# Patient Record
Sex: MALE | Race: WHITE
[De-identification: names, ages, dates, MRNs, and addresses within clinical notes are randomized per-mention and may not be internally consistent; named-entity substitution may affect disease eponyms.]

---

## 2018-08-17 ENCOUNTER — HOSPITAL ENCOUNTER (EMERGENCY)
Dept: HOSPITAL 25 - ED | Age: 18
LOS: 1 days | Discharge: HOME | End: 2018-08-18
Payer: COMMERCIAL
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
## 2018-08-17 DIAGNOSIS — S61.211A: Primary | ICD-10-CM

## 2018-08-17 PROCEDURE — 99282 EMERGENCY DEPT VISIT SF MDM: CPT

## 2018-08-17 PROCEDURE — 96374 THER/PROPH/DIAG INJ IV PUSH: CPT

## 2018-08-18 VITALS — DIASTOLIC BLOOD PRESSURE: 67 MMHG | SYSTOLIC BLOOD PRESSURE: 114 MMHG

## 2018-08-18 NOTE — ED
Laceration/Wound HPI





- HPI Summary


HPI Summary: 


18 male presents with left index finger laceration today.  He states he cut it 

on a knife.  No foreign body in the wound.  immunizations are up-to-date.  No 

numbness or tingling.  No active bleeding.  He has no medical conditions.








- History of Current Complaint


Stated Complaint: LT INDEX FINGER LACERATION


Time Seen by Provider: 08/18/18 00:43


Pain Intensity: 2





- Allergy/Home Medications


Allergies/Adverse Reactions: 


 Allergies











Allergy/AdvReac Type Severity Reaction Status Date / Time


 


No Known Allergies Allergy   Verified 08/17/18 23:41














PMH/Surg Hx/FS Hx/Imm Hx


Endocrine/Hematology History: 


   Denies: Hx Anticoagulant Therapy


Cardiovascular History: 


   Denies: Hx Myocardial Infarction


Infectious Disease History: No


Infectious Disease History: Reports: Traveled Outside the [place] in Last 30 Days - 

[place]





- Family History


Known Family History: 


   Negative: Diabetes





- Social History


Substance Use Type: Reports: None


Smoking Status (MU): Never Smoked Tobacco





Review of Systems


Negative: Fever


Negative: Chest Pain


Negative: Shortness Of Breath


Positive: Other - left index finger lac


All Other Systems Reviewed And Are Negative: Yes





Physical Exam


Triage Information Reviewed: Yes


Vital Signs On Initial Exam: 


 Initial Vitals











Temp Pulse Resp BP Pulse Ox


 


 98.2 F   92   16   138/82   99 


 


 08/17/18 23:37  08/17/18 23:37  08/17/18 23:37  08/17/18 23:37  08/17/18 23:37











Vital Signs Reviewed: Yes


Appearance: Positive: Well-Appearing


Skin: Positive: Warm, Dry, Other - 2cm superifical proximal phalanx left index 

finger


Head/Face: Positive: Normal Head/Face Inspection


Eyes: Positive: Normal, Conjunctiva Clear


ENT: Positive: Pharynx normal


Respiratory/Lung Sounds: Positive: Clear to Auscultation, Breath Sounds Present


Cardiovascular: Positive: Normal, RRR


Musculoskeletal: Positive: Strength/ROM Intact - left index finger, Other - 

capillary refill <2 secs


Neurological: Positive: Normal


Psychiatric: Positive: Normal





Procedures





- Laceration/Wound Repair


  ** 1


Location: Other - left index finger lac


Description: Linear


Length, Depth and Shape: 2cm superficial


Irrigated w/ Saline (ccs): 50


Closure: Skin Adhesive, SteriStrips


Sterile Dressing Applied?: No - metal finger splint





Diagnostics





- Vital Signs


 Vital Signs











  Temp Pulse Resp BP Pulse Ox


 


 08/17/18 23:37  98.2 F  92  16  138/82  99














- Laboratory


Lab Statement: Any lab studies that have been ordered have been reviewed, and 

results considered in the medical decision making process.





Laceration Repair Course/Dx





- Course


Course Of Treatment: 18 male presents with left index finger laceration today.  

He states he cut it on a knife.  No foreign body in the wound.  immunizations 

are up-to-date.  No numbness or tingling.  No active bleeding.  He has no 

medical conditions.  On exam has 2 cm superficial laceration of proximal 

phalanx of left index finger.  Neurovascularly intact.  Clean area and 

discussed with patient will place glue area.  Placed in a metal finger splint.  

Patient understands agrees with plan.





- Differential Dx


Differental Diagnoses: Abrasion, Avulsion, Laceration





- Clinical Impression


Provider Diagnoses: 


 Laceration of left index finger








Discharge





- Sign-Out/Discharge


Documenting (check all that apply): Patient Departure





- Discharge Plan


Condition: Good


Disposition: HOME


Patient Education Materials:  Skin Adhesive Care (ED)


Referrals: 


No Primary Care Phys,NOPCP [Primary Care Provider] - 


Additional Instructions: 


Place ice on area


Take Tylenol or ibuprofen for pain as needed every 6 hours


Keep dry for 24 hours


Glue will fall off on own   


keep splint on area for next 7 days


Return to ED if develop any signs of infection or any new or worsening symptoms





- Billing Disposition and Condition


Condition: GOOD


Disposition: Home

## 2019-02-27 ENCOUNTER — HOSPITAL ENCOUNTER (OUTPATIENT)
Dept: HOSPITAL 25 - ED | Age: 19
Setting detail: OBSERVATION
LOS: 2 days | Discharge: HOME | End: 2019-03-01
Attending: INTERNAL MEDICINE | Admitting: INTERNAL MEDICINE
Payer: COMMERCIAL

## 2019-02-27 DIAGNOSIS — E83.42: ICD-10-CM

## 2019-02-27 DIAGNOSIS — E86.0: ICD-10-CM

## 2019-02-27 DIAGNOSIS — K21.9: ICD-10-CM

## 2019-02-27 DIAGNOSIS — R11.2: ICD-10-CM

## 2019-02-27 DIAGNOSIS — R10.9: ICD-10-CM

## 2019-02-27 DIAGNOSIS — K52.9: Primary | ICD-10-CM

## 2019-02-27 DIAGNOSIS — R00.0: ICD-10-CM

## 2019-02-27 LAB
ALBUMIN SERPL BCG-MCNC: 4.8 G/DL (ref 3.2–5.2)
ALBUMIN/GLOB SERPL: 1.8 {RATIO} (ref 1–3)
ALP SERPL-CCNC: 80 U/L (ref 34–104)
ALT SERPL W P-5'-P-CCNC: 23 U/L (ref 7–52)
ANION GAP SERPL CALC-SCNC: 7 MMOL/L (ref 2–11)
AST SERPL-CCNC: 22 U/L (ref 13–39)
BASOPHILS # BLD AUTO: 0 10^3/UL (ref 0–0.2)
BUN SERPL-MCNC: 15 MG/DL (ref 6–24)
BUN/CREAT SERPL: 18.8 (ref 8–20)
CALCIUM SERPL-MCNC: 10 MG/DL (ref 8.6–10.3)
CHLORIDE SERPL-SCNC: 104 MMOL/L (ref 101–111)
EOSINOPHIL # BLD AUTO: 0 10^3/UL (ref 0–0.6)
FLUAV RNA SPEC QL NAA+PROBE: NEGATIVE
FLUBV RNA SPEC QL NAA+PROBE: NEGATIVE
GLOBULIN SER CALC-MCNC: 2.7 G/DL (ref 2–4)
GLUCOSE SERPL-MCNC: 109 MG/DL (ref 70–100)
HCO3 SERPL-SCNC: 28 MMOL/L (ref 22–32)
HCT VFR BLD AUTO: 47 % (ref 42–52)
HGB BLD-MCNC: 16.6 G/DL (ref 14–18)
LYMPHOCYTES # BLD AUTO: 0.2 10^3/UL (ref 1–4.8)
MAGNESIUM SERPL-MCNC: 1.7 MG/DL (ref 1.9–2.7)
MCH RBC QN AUTO: 34 PG (ref 27–31)
MCHC RBC AUTO-ENTMCNC: 36 G/DL (ref 31–36)
MCV RBC AUTO: 97 FL (ref 80–94)
MONOCYTES # BLD AUTO: 1 10^3/UL (ref 0–0.8)
NEUTROPHILS # BLD AUTO: 14.7 10^3/UL (ref 1.5–7.7)
NRBC # BLD AUTO: 0 10^3/UL
NRBC BLD QL AUTO: 0
PLATELET # BLD AUTO: 196 10^3/UL (ref 150–450)
POTASSIUM SERPL-SCNC: 4.1 MMOL/L (ref 3.5–5)
PROT SERPL-MCNC: 7.5 G/DL (ref 6.4–8.9)
RBC # BLD AUTO: 4.82 10^6/UL (ref 4–5.4)
SODIUM SERPL-SCNC: 139 MMOL/L (ref 135–145)
VIT C UR QL: (no result)
WBC # BLD AUTO: 16 10^3/UL (ref 3.5–10.8)

## 2019-02-27 PROCEDURE — 99284 EMERGENCY DEPT VISIT MOD MDM: CPT

## 2019-02-27 PROCEDURE — 96367 TX/PROPH/DG ADDL SEQ IV INF: CPT

## 2019-02-27 PROCEDURE — 83630 LACTOFERRIN FECAL (QUAL): CPT

## 2019-02-27 PROCEDURE — 81003 URINALYSIS AUTO W/O SCOPE: CPT

## 2019-02-27 PROCEDURE — 87046 STOOL CULTR AEROBIC BACT EA: CPT

## 2019-02-27 PROCEDURE — 83690 ASSAY OF LIPASE: CPT

## 2019-02-27 PROCEDURE — 81015 MICROSCOPIC EXAM OF URINE: CPT

## 2019-02-27 PROCEDURE — 83605 ASSAY OF LACTIC ACID: CPT

## 2019-02-27 PROCEDURE — 96365 THER/PROPH/DIAG IV INF INIT: CPT

## 2019-02-27 PROCEDURE — 74177 CT ABD & PELVIS W/CONTRAST: CPT

## 2019-02-27 PROCEDURE — 87493 C DIFF AMPLIFIED PROBE: CPT

## 2019-02-27 PROCEDURE — 96375 TX/PRO/DX INJ NEW DRUG ADDON: CPT

## 2019-02-27 PROCEDURE — 83735 ASSAY OF MAGNESIUM: CPT

## 2019-02-27 PROCEDURE — G0378 HOSPITAL OBSERVATION PER HR: HCPCS

## 2019-02-27 PROCEDURE — 85025 COMPLETE CBC W/AUTO DIFF WBC: CPT

## 2019-02-27 PROCEDURE — 87899 AGENT NOS ASSAY W/OPTIC: CPT

## 2019-02-27 PROCEDURE — 87045 FECES CULTURE AEROBIC BACT: CPT

## 2019-02-27 PROCEDURE — 87641 MR-STAPH DNA AMP PROBE: CPT

## 2019-02-27 PROCEDURE — 96361 HYDRATE IV INFUSION ADD-ON: CPT

## 2019-02-27 PROCEDURE — 87077 CULTURE AEROBIC IDENTIFY: CPT

## 2019-02-27 PROCEDURE — 80053 COMPREHEN METABOLIC PANEL: CPT

## 2019-02-27 PROCEDURE — 71046 X-RAY EXAM CHEST 2 VIEWS: CPT

## 2019-02-27 PROCEDURE — 87040 BLOOD CULTURE FOR BACTERIA: CPT

## 2019-02-27 PROCEDURE — 86140 C-REACTIVE PROTEIN: CPT

## 2019-02-27 PROCEDURE — 74019 RADEX ABDOMEN 2 VIEWS: CPT

## 2019-02-27 PROCEDURE — 36415 COLL VENOUS BLD VENIPUNCTURE: CPT

## 2019-02-27 RX ADMIN — SODIUM CHLORIDE ONE MLS/HR: 900 IRRIGANT IRRIGATION at 10:29

## 2019-02-27 RX ADMIN — SODIUM CHLORIDE ONE MLS/HR: 900 IRRIGANT IRRIGATION at 08:50

## 2019-02-27 RX ADMIN — CIPROFLOXACIN SCH MLS/HR: 2 INJECTION, SOLUTION INTRAVENOUS at 21:33

## 2019-02-27 RX ADMIN — METRONIDAZOLE SCH MLS/HR: 5 INJECTION, SOLUTION INTRAVENOUS at 23:09

## 2019-02-27 RX ADMIN — SODIUM CHLORIDE SCH MLS/HR: 900 IRRIGANT IRRIGATION at 16:44

## 2019-02-27 RX ADMIN — ONDANSETRON PRN MG: 2 INJECTION INTRAMUSCULAR; INTRAVENOUS at 23:17

## 2019-02-27 NOTE — HP
HISTORY AND PHYSICAL:

 

DATE OF ADMISSION:  19.

 

PRIMARY CARE PROVIDER:  Dr. Brent Hickman from [facility] in [place], [place].

 

OTHER PROVIDERS:  Dr. Dafne Kirk.

 

ATTENDING PHYSICIAN:  Dr. Zuly Miller.* (DICTATED BY BRAXTON WARD
)

 

CHIEF COMPLAINT:  Nausea, vomiting, diarrhea.

 

HISTORY OF PRESENT ILLNESS:  Mr. Dobbs is a 18-year-old male with a past 
medical history of gastroesophageal reflux disease and environmental allergies, 
who presented to the ER today with complaints of nausea, vomiting, and 
diarrhea.  The patient states that he woke at 2:30 this morning and experienced 
approximately 5 bouts of diarrhea and 5 bouts of vomiting.  This occurred 
between the hours of 2:30 a.m. and 7 a.m.  Since then, he has had neither 
vomiting or diarrhea.  He states that throughout this, he has experienced 
nausea.  He states that he came to the ER and was given IV antiemetic and 
antinausea medication, Zofran, which has relieved all of his symptoms.  He does 
complain currently of fever, chills/sweats, muscle pain, headache, weakness, 
and fatigue.  He states that he had previously had abdominal pain, but this was 
relieved with Zofran as well.  The patient states that he was able to take 
Tylenol as well as drink some water around noon and he has been able to keep it 
down.  He has not attempted to eat anything today.  The patient has had some 
recent travel.  Approximately 1 month ago, he returned from the [place].  He had gone as part of a [employer] trip with some classmates.  
He reports that he did not drink any water while in the [place] and 
states that none of his other classmates are ill from the trip.  The patient 
also states that he just returned from a birding trip in [place] yesterday.
  He flew to and from [place].  Rinku states that he had a ramen salad 
that he bought at a store yesterday.  He states that it did not taste suspicious
, but is concerned because it contained ramen lettuce.  The patient denies 
chest pain.  He states that he does have some shortness of breath, which has 
resolved, but had occurred previously when he was being taken for multiple 
tests such as CAT scan and x-ray testing.  He states that he does have a 
headache.  He denies melena and hematochezia.  He denies coffee-ground emesis.

 

PAST MEDICAL HISTORY:

1.  Gastroesophageal reflux disease.  The patient is seeing Dr. Kirk for 
GERD.  He states that he was referred from ENT due to postnasal drainage and 
sinus headache, which ENT thought could be related to GERD.  Dr. Kirk 
and he had discussed starting pantoprazole to see if his symptoms resolved and 
if not, she suggested that he may need an EGD for possible peptic ulcer disease.

2.  Environmental allergies.

 

PAST SURGICAL HISTORY:  None.

 

HOME MEDICATIONS:  Pantoprazole 40 mg p.o. daily, started first week in 
December.

 

ALLERGIES:  No known drug allergies.

 

FAMILY HISTORY:  Father, mitral valve prolapse.  Mother, healthy.  Maternal 
grandfather, prostate cancer.  Paternal grandfather, history of colon polyps, 
 at the age of 92.

 

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 
[employer] sciences.  His family is in [place].  He lives in the dorm 
rooms at [employer].  The patient states that he does not and never has smoked.  
He does admit to drinking 1 to 2 glasses of wine or beer per week, although he 
states he has not drank in a couple of weeks.  In the event that he is unable 
to make his own medical decisions, he appoints Marcella Dobbs, his mother, 
phone number [phone], to make medical decisions for him.

 

REVIEW OF SYSTEMS:  A 10-point review of systems was performed and all the 
pertinent positives and negative findings are in the HPI.  All other systems 
are negative.

 

                               PHYSICAL EXAMINATION

 

GENERAL:  Mr. Dobbs is a well-developed, well-nourished young white man who is 
sitting up in bed.  He is in no acute distress.  He appears slightly 
diaphoretic.

 

VITAL SIGNS:  Temperature 99.7 orally, heart rate 109, oxygen saturation 99% on 
room air, blood pressure 110/40.

 

HEENT:  Visual fields are grossly intact.  Pupils are equally round and 
reactive to light and accommodation.  Extraocular movements are intact.  
Sclerae without icterus.  Hearing is grossly intact.  External auditory canals 
patent and free of cerumen.  Tympanic membranes intact with visible landmarks.  
Nares are patent and nonerythematous.  Oral mucous membranes are moist and 
without lesions.  Pharynx is clear.

 

NECK:  Full range of motion.  Thyroid is not palpable.  Trachea is at midline. 
There is no lymphadenopathy.

 

RESPIRATORY:  Symmetrical chest expansion with no use of accessory muscles.  
Lungs are clear to auscultation.  There are no rhonchi, wheezes, or rales.

 

CARDIOVASCULAR:  S1, S2 present, tachycardic rate with a regular rhythm.  There 
are no murmurs, rubs, or gallops.  No JVD.

 

ABDOMEN:  Bowel sounds hypoactive.  Abdomen is soft and nontender to palpation. 
There are no palpable masses.  There is no hepatosplenomegaly.

 

MUSCULOSKELETAL:  Full range of motion with no pain or deformities.

 

EXTREMITIES:  Skin is warm and smooth bilaterally.  There is no edema.  No 
clubbing or cyanosis.  Pedal pulses are 2+ bilaterally.

 

NEURO:  The patient is awake, alert and oriented x3.  Cranial nerves are 
grossly intact.  The patient is able to move all of his extremities.  Motor 
strength is 5/5 in upper and lower extremities bilaterally.  The patient has a 
steady gait with no impairments.

 

 DIAGNOSTIC STUDIES/LABORATORY DATA:  Abdomen x-ray 19, impression:  Air 
fluid level in the stomach, psoas margins are intact.  Possibility of gas is 
noted in the remainder of the abdomen.

 

Abdomen/pelvis CT 19, impression:  No acute CT pathology of the 
visualized abdomen or pelvis.

 

Chest x-ray 19, impression:  No active cardiopulmonary disease is noted.

 

Laboratory data:  WBC 16.0, RBC 4.82, HGB 16.6, HCT 47, platelets 196.  Sodium 
139, potassium 4.1, chloride 104, carbon dioxide 28, BUN 15, creatinine 0.80, 
glucose 109, lactic acid 1.5, calcium 10, magnesium 1.7.  Total bilirubin 1.50, 
AST 22, ALT 23, alk phos 80, CRP 3.16, lipase 30.  Urine with 1+ protein.  
Influenza A negative.  Influenza B negative.  Stool, C. diff negative, 
lactoferrin positive. Shiga toxin 1 and 2 pending.

 

ASSESSMENT AND PLAN:  Mr. Dobbs is a 18-year-old male with a past medical 
history as described above, who presented to the ER today with complaints of 
nausea, vomiting, and diarrhea.  The patient will be admitted observation for:

 

1.  Nausea, vomiting, diarrhea.  The patient has so far received 3 L of fluid, 
which has improved his tachycardia and blood pressure slightly, although the 
patient remains tachycardic.  Maintenance fluids will be ordered at a rate of 
125 mL per hour.  Zofran 4 mg q.4 hours ordered.  The patient is ready to 
attempt to eat food.  Clear liquid diet ordered, advance as tolerated.  Await 
remainder of stool cultures.  Continue to monitor electrolytes.

2.  Headache.  The patient states that he typically takes Excedrin for his 
headaches, this was ordered.

3.  Hypomagnesemia.  The patient's magnesium level was 1.7.  Magnesium 2 g IV 
are ordered.  Recheck magnesium in the morning.

4.  FEN.  The patient is placed on clear liquid diet for the time being with 
instructions to advance as tolerated.

5.  Code status.  The patient is a full code.

6.  DVT prophylaxis.  Based on the DVT Risk Assessment, the patient is low 
risk.  I will order TA stockings for him.

 

TIME SPENT:  Approximately 60 minutes were spent on this admission, greater 
than half of that time was spent with the patient obtaining history, performing 
a physical, and reviewing the plan of care.

 

The case has been reviewed with my attending, Dr. Miller, who is in agreement 
with the plan of care.

 

 ____________________________________ BRAXTON WARD

 

[id number] #: [id number]

LUBA

## 2019-02-27 NOTE — ED
GI/ HPI





- HPI Summary


HPI Summary: 





This pt is a 17 y/o male presenting to [facility] c/o nausea, vomiting, diarrhea 

since 0230 this morning. Pt reports that prior to onset of his symptoms he had 

a salad from [employer]. At around 0230 he began to have nausea and vomiting, so 

far he has had 4-5 episodes of emesis. He describes emesis as bile. Pt also 

reports diarrhea, described as watery, green in color, and nonbloody. 

Additionally notes chills and abd cramping that is moderately alleviated with 

bowel movements. Denies fever, chest pain, SOB. 


Pt had recent antibiotics 3 weeks ago, doxycycline. 


He traveled out of the country in January 2019 to [place]. 





- History of Current Complaint


Chief Complaint: EDNauseaVomitDiarrh


Time Seen by Provider: 02/27/19 08:00


Stated Complaint: GENERAL ILLNESS


Hx Obtained From: Patient


Onset/Duration: Started Hours Ago, Still Present


Timing: Lasting Hours


Current Severity: Mild


Pain Intensity: 3


Location of Pain: Diffuse


Associated Signs and Symptoms: Positive: Nausea, Vomiting, Diarrhea, Chills, 

Abdominal Pain.  Negative: Fever, Chest Pain


Aggravating Factor(s): Nothing


Alleviating Factor(s): Nothing





- Allergy/Home Medications


Allergies/Adverse Reactions: 


 Allergies











Allergy/AdvReac Type Severity Reaction Status Date / Time


 


No Known Allergies Allergy   Verified 02/27/19 07:59











Home Medications: 


 Home Medications





Pantoprazole Sodium 40 mg PO DAILY 02/27/19 [History Confirmed 02/27/19]











PMH/Surg Hx/FS Hx/Imm Hx


Endocrine/Hematology History: 


   Denies: Hx Anticoagulant Therapy


Cardiovascular History: 


   Denies: Hx Myocardial Infarction


Infectious Disease History: Yes


Infectious Disease History: Reports: Hx of Known/Suspected MRSA - MRSA 

infection to previous L knee injury at 8 years old, Traveled Outside the [place] in 

Last 30 Days - January 2019 - [place]





- Family History


Known Family History: 


   Negative: Diabetes





- Social History


Alcohol Use: Rare


Substance Use Type: Reports: None


Smoking Status (MU): Never Smoked Tobacco





Review of Systems


Positive: Chills.  Negative: Fever


Negative: Chest Pain


Negative: Shortness Of Breath


Positive: Abdominal Pain, Vomiting, Diarrhea, Nausea


Musculoskeletal: Negative


All Other Systems Reviewed And Are Negative: Yes





Physical Exam





- Summary


Physical Exam Summary: 





VITAL SIGNS: Reviewed.


GENERAL: Patient is a well-developed and nourished male who is lying 

comfortable in the stretcher. Patient is not in any acute respiratory distress.


HEAD AND FACE: No signs of trauma. No ecchymosis, hematomas or skull 

depressions. No sinus tenderness.


EYES: PERRLA, EOMI x 2, No injected conjunctiva, no nystagmus.


EARS: Hearing grossly intact. Ear canals and tympanic membranes are within 

normal limits.


MOUTH: Oropharynx within normal limits. Dry oral mucosa.


NECK: Supple, trachea is midline, no adenopathy, no JVD, no carotid bruit, no c-

spine tenderness, neck with full ROM.


CHEST: Symmetric, no tenderness at palpation


LUNGS: Clear to auscultation bilaterally. No wheezing or crackles.


CVS: A little tachycardic but regular rhythm, S1 and S2 present, no murmurs or 

gallops appreciated.


ABDOMEN: Soft, non-tender. No signs of distention. No rebound, no guarding, and 

no masses palpated. Bowel sounds are normal.


EXTREMITIES: FROM in all major joints, no edema, no cyanosis or clubbing.


NEURO: Alert and oriented x 3. No acute neurological deficits. Speech is normal 

and follows commands.


SKIN: Dry and warm


Triage Information Reviewed: Yes


Vital Signs On Initial Exam: 


 Initial Vitals











Temp Pulse Resp BP Pulse Ox


 


 97.1 F   118   18   124/84   99 


 


 02/27/19 07:50  02/27/19 07:50  02/27/19 07:50  02/27/19 07:50  02/27/19 07:50











Vital Signs Reviewed: Yes





Diagnostics





- Vital Signs


 Vital Signs











  Temp Pulse Resp BP Pulse Ox


 


 02/27/19 07:50  97.1 F  118  18  124/84  99














- Laboratory


Result Diagrams: 


 02/27/19 08:49





 02/27/19 08:49


Lab Statement: Any lab studies that have been ordered have been reviewed, and 

results considered in the medical decision making process.





- Radiology


  ** Abdomen XR


Radiology Interpretation Completed By: Radiologist


Summary of Radiographic Findings: IMPRESSION: Air-fluid level in the stomach. 

Psoas margins are intact. Possibility of gas is noted in the remainder of the 

abdomen. Dr. Camacho has reviewed this report.





GIGU Course/Dx





- Course


Assessment/Plan: Patient is a 18-year-old presents to the emergency department 

with chief complaint of having nausea vomiting diarrhea since 1-year-old this 

morning.  The patient has a recent traveling to the [place] in 

January and he just finished taking doxycycline a week ago.  In the ED course 

the patient was hydrated with IV fluids, and he was given Zofran for nausea and 

vomiting.  Blood test results without any significant abnormality except for 

WBCs of 16, glucose 109, magnesium 1.7, total bili 1.5.  Urinalysis 1+ proteins

, C diff is negative.  X-ray of the abdomen impression: Air-fluid level in the 

stomach.  Psoas margins are intact.  Possibly of gas is noted in the remainder 

of the abdomen.  Patient develops fever for which the patient was given 

Tylenol.  The patient was given an additional liter of IV fluids.  Patient 

reports that he is feeling better and he has no nausea vomiting and he has not 

have any other bowel movements.  He developed a fever.  He was given Tylenol.  

Patients continues to be tachycardic, despite 2 L of IV fluids, Tylenol.  He 

reports that the abdominal pain has resolved however I would do an 

abdominopelvic CT to rule out any intra-abdominal pathology.  Chest x-ray 

impression: No active Pulmonary disease.  Abdominal pelvic CT impression: No 

acute pathology visualized abdomen or pelvis.  Because of the patients 

tachycardia, slightly hypertension I discussed my physical exam and findings 

with and Dr. Miller from the hospitalist services who accepted the patient for 

admission.  The patient continues to feel better however he is still 

tachycardic and slightly hypotensive.





- Diagnoses


Provider Diagnoses: 


 Nausea and vomiting, Diarrhea, Tachycardia








Discharge





- Sign-Out/Discharge


Documenting (check all that apply): Patient Departure


Patient Received Moderate/Deep Sedation with Procedure: No





- Discharge Plan


Condition: Stable


Disposition: ADMITTED TO [facility]


Referrals: 


[facility] [Outside]


[facility] [place] [facility] [Outside]





- Billing Disposition and Condition


Condition: STABLE


Disposition: Admitted to [facility]





- Attestation Statements


Document Initiated by Patricia: Yes


Documenting Scribe: Carisa Lemon


Provider For Whom Patricia is Documenting (Include Credential): Carlos Camacho MD


Scribe Attestation: 


Carisa SINCLAIR, scribed for Carlos Camacho MD on 02/27/19 at 1527. 


Scribe Documentation Reviewed: Yes


Provider Attestation: 


The documentation as recorded by the Carisa salgado accurately reflects 

the service I personally performed and the decisions made by me, Carlos Camacho MD


Status of Scribe Document: Viewed

## 2019-02-27 NOTE — PN
Sepsis Event Evaluation


Date of Evaluation: 02/27/19


Time of Evaluation: 20:20


Current Stage of Sepsis: Sepsis


Vital Signs - Last 12 Hours: 


 Vital Signs - 12 hr











  Temp Pulse Resp BP Pulse Ox


 


 02/27/19 20:05    18  


 


 02/27/19 18:51  101.7 F  113  18  129/52  100


 


 02/27/19 18:00  99.7 F  109  16  110/61  100


 


 02/27/19 16:11   109   110/61  100


 


 02/27/19 16:01   110    100


 


 02/27/19 15:41   104   125/68  100


 


 02/27/19 15:21  99.7 F    


 


 02/27/19 15:11   109   110/40  99


 


 02/27/19 15:01   114    100


 


 02/27/19 14:41   115   115/51  99


 


 02/27/19 14:27   110    99


 


 02/27/19 13:41   120   114/47  98


 


 02/27/19 13:11   118   106/39  97


 


 02/27/19 13:08   118   98/37  97


 


 02/27/19 13:00   120    97


 


 02/27/19 12:41   120   86/35  98


 


 02/27/19 12:11   126   110/54  98


 


 02/27/19 12:00  102.7 F  124  17  110/54  98


 


 02/27/19 11:41   121   102/50  98


 


 02/27/19 11:11   117   105/40  100


 


 02/27/19 10:41   120   102/50  99


 


 02/27/19 10:34   121   100/57  98


 


 02/27/19 10:33   118    98


 


 02/27/19 10:32   109    97


 


 02/27/19 10:00   120    99


 


 02/27/19 09:00   109    100











Lactic Acid: 


 











  02/27/19





  08:49


 


Lactic Acid  1.5














- Cardiopulmonary Exam


Capillary Refill: Immediate


Respiratory: Symmetrical Chest Expansion and Respiratory Effort, Clear to 

Auscultation


Cardiovascular: NL Sounds; No Murmurs; No JVD, No Edema, - - Regular rate, 

tachycardic





- Peripheral Pulse Exam


Radial Pulses: Bilateral Normal


Pedal Pulses: Bilateral Normal


Posterior Tibial Pulse: Bilateral Normal





- Skin Exam


Skin Exam: Pink, Diaphoretic





- Dale Coma Scale


Best Eye Response: 4 - Spontaneous


Best Motor Response: 6 - Obeys Commands


Best Verbal Response: 5 - Oriented


Coma Scale Total: 15





Assess/Plan/Problems-Billing


Assessment:

## 2019-02-27 NOTE — PN
Hospitalist Progress Note


Date of Service: 02/27/19





Pt seen; states he is feeling well, although he is diaphoretic.  States he is 

feeling less weak.


He meets sepsis criteria and reports no blood in BMs.  Has elevated WBC, 

continues with tachycardia, elevated temp.  Will start on Cipro and Flagyl 

tonight.  


Lactic acid WNL at 1.1

## 2019-02-28 LAB
ALBUMIN SERPL BCG-MCNC: 3.5 G/DL (ref 3.2–5.2)
ALBUMIN/GLOB SERPL: 1.8 {RATIO} (ref 1–3)
ALP SERPL-CCNC: 55 U/L (ref 34–104)
ALT SERPL W P-5'-P-CCNC: 15 U/L (ref 7–52)
ANION GAP SERPL CALC-SCNC: 2 MMOL/L (ref 2–11)
AST SERPL-CCNC: 16 U/L (ref 13–39)
BASOPHILS # BLD AUTO: 0 10^3/UL (ref 0–0.2)
BUN SERPL-MCNC: 9 MG/DL (ref 6–24)
BUN/CREAT SERPL: 11.7 (ref 8–20)
CALCIUM SERPL-MCNC: 8.5 MG/DL (ref 8.6–10.3)
CHLORIDE SERPL-SCNC: 107 MMOL/L (ref 101–111)
EOSINOPHIL # BLD AUTO: 0.1 10^3/UL (ref 0–0.6)
GLOBULIN SER CALC-MCNC: 2 G/DL (ref 2–4)
GLUCOSE SERPL-MCNC: 94 MG/DL (ref 70–100)
HCO3 SERPL-SCNC: 28 MMOL/L (ref 22–32)
HCT VFR BLD AUTO: 37 % (ref 42–52)
HGB BLD-MCNC: 13.1 G/DL (ref 14–18)
LYMPHOCYTES # BLD AUTO: 0.6 10^3/UL (ref 1–4.8)
MAGNESIUM SERPL-MCNC: 1.8 MG/DL (ref 1.9–2.7)
MCH RBC QN AUTO: 34 PG (ref 27–31)
MCHC RBC AUTO-ENTMCNC: 35 G/DL (ref 31–36)
MCV RBC AUTO: 97 FL (ref 80–94)
MONOCYTES # BLD AUTO: 0.6 10^3/UL (ref 0–0.8)
NEUTROPHILS # BLD AUTO: 3 10^3/UL (ref 1.5–7.7)
NRBC # BLD AUTO: 0 10^3/UL
NRBC BLD QL AUTO: 0
PLATELET # BLD AUTO: 135 10^3/UL (ref 150–450)
POTASSIUM SERPL-SCNC: 3.9 MMOL/L (ref 3.5–5)
PROT SERPL-MCNC: 5.5 G/DL (ref 6.4–8.9)
RBC # BLD AUTO: 3.82 10^6/UL (ref 4–5.4)
SODIUM SERPL-SCNC: 137 MMOL/L (ref 135–145)
WBC # BLD AUTO: 4.4 10^3/UL (ref 3.5–10.8)

## 2019-02-28 RX ADMIN — CIPROFLOXACIN SCH MLS/HR: 2 INJECTION, SOLUTION INTRAVENOUS at 21:16

## 2019-02-28 RX ADMIN — METRONIDAZOLE SCH MLS/HR: 5 INJECTION, SOLUTION INTRAVENOUS at 14:30

## 2019-02-28 RX ADMIN — CIPROFLOXACIN SCH MLS/HR: 2 INJECTION, SOLUTION INTRAVENOUS at 10:01

## 2019-02-28 RX ADMIN — PANTOPRAZOLE SODIUM SCH MG: 40 TABLET, DELAYED RELEASE ORAL at 09:19

## 2019-02-28 RX ADMIN — METRONIDAZOLE SCH MLS/HR: 5 INJECTION, SOLUTION INTRAVENOUS at 06:20

## 2019-02-28 RX ADMIN — METRONIDAZOLE SCH MLS/HR: 5 INJECTION, SOLUTION INTRAVENOUS at 23:00

## 2019-02-28 RX ADMIN — ONDANSETRON PRN MG: 2 INJECTION INTRAMUSCULAR; INTRAVENOUS at 18:20

## 2019-02-28 RX ADMIN — SODIUM CHLORIDE SCH MLS/HR: 900 IRRIGANT IRRIGATION at 14:30

## 2019-02-28 RX ADMIN — SODIUM CHLORIDE SCH MLS/HR: 900 IRRIGANT IRRIGATION at 04:57

## 2019-02-28 NOTE — PN
Subjective


Date of Service: 02/28/19


Interval History: 





Patient seen and examined. States vomiting has subsided, so has diarrhea. 

Overall feeling better. No fevers or chills. Tolerating CLD.





Objective


Active Medications: 








Acetaminophen (Tylenol Tab*)  650 mg PO Q6H PRN


   PRN Reason: FEVER/PAIN


Acetaminophen/Butalbital/Caffeine (Fioricet Tab*)  1 tab PO DAILY PRN


   PRN Reason: HEADACHE


Sodium Chloride (Ns 0.9% 1000 Ml**)  1,000 mls @ 125 mls/hr IV PER RATE [facility]


   Last Admin: 02/28/19 14:30 Dose:  125 mls/hr


Ciprofloxacin/Dextrose (Cipro 400 Mg Ivpremix(*))  400 mg in 200 mls @ 200 mls/

hr IVPB Q12H [facility]


   Last Admin: 02/28/19 10:01 Dose:  200 mls/hr


Metronidazole/Sodium Chloride (Flagyl 500 Mg Ivpb*)  500 mg in 100 mls @ 100 mls

/hr IVPB Q8H [facility]


   Last Admin: 02/28/19 14:30 Dose:  100 mls/hr


Ondansetron HCl (Zofran Inj*)  4 mg IV Q4H PRN


   PRN Reason: NAUSEA/VOMITING


   Last Admin: 02/27/19 23:17 Dose:  4 mg


Pantoprazole Sodium (Protonix Tab*)  40 mg PO DAILY [facility]


   Last Admin: 02/28/19 09:19 Dose:  40 mg








Oxygen Devices in Use Now: None


Appearance: alert, NAD


Eyes: No Scleral Icterus, PERRLA


Ears/Nose/Mouth/Throat: NL Teeth, Lips, Gums, Mucous Membranes Moist


Neck: NL Appearance and Movements; NL JVP, Trachea Midline


Respiratory: Symmetrical Chest Expansion and Respiratory Effort, Clear to 

Auscultation


Cardiovascular: NL Sounds; No Murmurs; No JVD, RRR, No Edema


Abdominal: NL Sounds; No Tenderness; No Distention, No Hepatosplenomegaly


Extremities: No Edema, No Clubbing, Cyanosis


Skin: No Rash or Ulcers


Neurological: Alert and Oriented x 3, NL Gait


Nutrition: - - CLD


Result Diagrams: 


 02/28/19 05:56





 02/28/19 05:56


Microbiology and Other Data: 


 Microbiology











 02/27/19 08:34 Stool Gross Appearance - Final





 Stool Shiga Toxin I & II - Final





    Negative Shiga Toxin 1 & 2





 Stool Lactoferrin - Final


 


 02/28/19 04:20 Nasal Screen MRSA (PCR) - Final





 Nasal    Mrsa Not Detected


 


 02/27/19 08:34 Stool Gross Appearance - Final





 Stool C. difficile DNA Amplification - Final





    027 Presumptive NEGATIVE





    Toxigenic C.diff NEGATIVE


 


 02/27/19 12:06 Influenza Types A,B Antigen - Final





 Nasal    Specimen received for Influenza A/B Molecular testing














Assess/Plan/Problems-Billing


Assessment: 





This is a 18 year old male with no significant medical history that presented 

to ER with extreme n/v/d and abdominal pain of unclear etiology, meeting sepsis 

criteria.





- Patient Problems


(1) Gastroenteritis


Code(s): K52.9 - NONINFECTIVE GASTROENTERITIS AND COLITIS, UNSPECIFIED   SNOMED 

Code(s): 41419734


   Comment: 


 - Viral vs bacterial, patient has recentely traveled out of the country


 - Follow stool and blood cultures


 - Leukocytosis improving


 - Continue cipro and flagyl, will DC if cx negative


 - PPI while inpatient


 - Tolerating CLD, will decrease IVF and start soft/GI diet   





(2) Sepsis


Comment: 


 - With hypotension at admission now improved


 - s/p fluid resuscitation 30ml/kg


 - Resolved   





(3) Dehydration


Code(s): E86.0 - DEHYDRATION   SNOMED Code(s): 30044186


   Comment: 


 - Improved with IVF


   





(4) Hypomagnesemia


Code(s): E83.42 - HYPOMAGNESEMIA   SNOMED Code(s): 118187361


   Comment: 


 - Replete with 3gm Mg   


Status and Disposition: 





Inpatient, if patient can tolerate PO intake and cultures negative, may be able 

to DC home tomorrow.

## 2019-03-01 VITALS — SYSTOLIC BLOOD PRESSURE: 109 MMHG | DIASTOLIC BLOOD PRESSURE: 61 MMHG

## 2019-03-01 RX ADMIN — SODIUM CHLORIDE SCH MLS/HR: 900 IRRIGANT IRRIGATION at 02:54

## 2019-03-01 RX ADMIN — CIPROFLOXACIN SCH MLS/HR: 2 INJECTION, SOLUTION INTRAVENOUS at 08:03

## 2019-03-01 RX ADMIN — PANTOPRAZOLE SODIUM SCH MG: 40 TABLET, DELAYED RELEASE ORAL at 08:03

## 2019-03-01 RX ADMIN — METRONIDAZOLE SCH MLS/HR: 5 INJECTION, SOLUTION INTRAVENOUS at 06:25

## 2019-03-01 NOTE — DCNOTE
Objective


Active Medications: 








Acetaminophen (Tylenol Tab*)  650 mg PO Q6H PRN


   PRN Reason: FEVER/PAIN


   Last Admin: 03/01/19 09:04 Dose:  650 mg


Acetaminophen/Butalbital/Caffeine (Fioricet Tab*)  1 tab PO DAILY PRN


   PRN Reason: HEADACHE


Ciprofloxacin/Dextrose (Cipro 400 Mg Ivpremix(*))  400 mg in 200 mls @ 200 mls/

hr IVPB Q12H [facility]


   Last Admin: 03/01/19 08:03 Dose:  200 mls/hr


Metronidazole/Sodium Chloride (Flagyl 500 Mg Ivpb*)  500 mg in 100 mls @ 100 mls

/hr IVPB Q8H [facility]


   Last Admin: 03/01/19 06:25 Dose:  100 mls/hr


Ondansetron HCl (Zofran Inj*)  4 mg IV Q4H PRN


   PRN Reason: NAUSEA/VOMITING


   Last Admin: 02/28/19 18:20 Dose:  4 mg


Pantoprazole Sodium (Protonix Tab*)  40 mg PO DAILY [facility]


   Last Admin: 03/01/19 08:03 Dose:  40 mg








 Vital Signs - 8 hr











  03/01/19 03/01/19





  03:37 07:28


 


Temperature 98.1 F 98.3 F


 


Pulse Rate 73 83


 


Respiratory 20 20





Rate  


 


Blood Pressure 101/51 111/66





(mmHg)  


 


O2 Sat by Pulse 99 100





Oximetry  











Oxygen Devices in Use Now: None


Result Diagrams: 


 02/28/19 05:56





 02/28/19 05:56


Microbiology and Other Data: 


 Microbiology











 02/27/19 08:34 Stool Gross Appearance - Final





 Stool Shiga Toxin I & II - Final





    Negative Shiga Toxin 1 & 2





 Stool Lactoferrin - Final


 


 02/28/19 04:20 Nasal Screen MRSA (PCR) - Final





 Nasal    Mrsa Not Detected


 


 02/27/19 08:34 Stool Gross Appearance - Final





 Stool C. difficile DNA Amplification - Final





    027 Presumptive NEGATIVE





    Toxigenic C.diff NEGATIVE


 


 02/27/19 12:06 Influenza Types A,B Antigen - Final





 Nasal    Specimen received for Influenza A/B Molecular testing














Assess/Plan/Problems-Billing


Assessment: 





This is a 18 year old male with no significant medical history that presented 

to ER with extreme n/v/d and abdominal pain of unclear etiology, meeting sepsis 

criteria.


Status and Disposition: 





Inpatient, if patient can tolerate PO intake and cultures negative, may be able 

to DC home tomorrow.

## 2019-03-01 NOTE — DS
CC:  [facility]; Dr. Brent Hickman from [facility] in [place], [place]; 

Dr. Dafne Kirk *

 

DISCHARGE SUMMARY:

 

DATE OF ADMISSION:  02/27/19

 

DATE OF DISCHARGE:  03/01/19

 

PRIMARY CARE PHYSICIAN:  [facility].

 

PROVIDER:  Janine Mckeon NP.

 

ATTENDING PHYSICIAN:  Dr. Marques Thompson * (dictated by Janine Mckeon NP)
.

 

CONSULTING PROVIDER:  Dr. Dafne Kirk, Gastroenterology.

 

PRIMARY CARE PROVIDER:  Dr. Brent Hickman and [facility] at 
[place].

 

PRIMARY DISCHARGE DIAGNOSES:

1.  Gastroenteritis.

2.  Dehydration.

 

SECONDARY DISCHARGE DIAGNOSES:

1.  Gastroesophageal reflux disease.

2.  Environmental allergies.

 

MEDICATIONS AT DISCHARGE:

1.  Pantoprazole 40 mg daily.

2.  Ondansetron ODT 4 mg q.6 hours p.r.n.

 

DIAGNOSTIC TESTING DURING THIS ADMISSION:

1.  CT abdomen and pelvis.  Impression:  No acute CT pathology of the 
visualized abdomen or pelvis.

2.  Abdominal x-ray shows air-fluid level in the stomach, psoas margins are 
intact, possibility of gases noted in the remainder of the abdomen.

3.  CT x-ray shows no active cardiopulmonary disease.

 

HOSPITAL COURSE OF STAY:  For full details, please refer to the H and P 
provided by BRAXTON Hanks, on 

02/27/19.  In summary, this is a 18-year-old male who presented to the ER with 
complaint of nausea, vomiting, and diarrhea.  He had accompanying fever and 
chills, muscle pain, headache, weakness, and fatigue.  He had previously 
travelled to [place] about a month prior and recently flew to 
[place] for birding trip.  He was admitted under observation with concern 
for dehydration and intractable nausea, vomiting, and diarrhea.  He did have an 
initial white blood cell count of 16,000 that did resolve to 4000 the following 
day after receiving fluids and antibiotics.  He had demonstrated hypomagnesemia 
that was repleted.  His flu swabs were negative.  Blood cultures have shown no 
growth so far.  Stool culture showed no enteric pathogens.  He had a negative 
C. diff, negative Shiga toxin.  He did have a positive fecal lactoferrin, which 
corresponds with  intestinal inflammation.  He continued to improve with 
supportive care.  He was placed on ciprofloxacin, metronidazole, but these were 
discontinued as it appears that this is most likely a viral pathogen as opposed 
to bacterial.

 

On the day of discharge, which is 03/01/19, he is alert and oriented.  He is 
tolerating fluids.  He is tolerating solid foods in small amounts.  He was 
advised to continue to advance his diet as tolerated, but to focus on getting 
enough fluids and to prevent further dehydration.  He reports understanding of 
this.  He demonstrates ability to ambulate and feels safe to go back to his 
dorm.  His father is up here from [place] and will be with him for the 
weekend.  He was given a script for p.r.n. Zofran and advised to follow up with 
[facility] next week.  He will be called if there are any further 
concerns or growths seen on his blood cultures, in which case he will be 
restarted on antibiotics.  He is okay with this plan and verbalizes 
understanding.  He was also given work note and school note for missed classes 
and work this week and was advised to refrain from working this weekend.  
Symptoms to monitor at home were reviewed, which include fever, chills, 
hematemesis, melena or hematochezia, chest pain, shortness of breath, severe 
abdominal pain, nausea or vomiting, or diarrhea that was refractory to 
supportive medications.



EXAM:

General: Young male, lying in bed, no acute distress. HEENT: Head atraumatic, 
normocephalic, PERRLA, extraocular movement intact, oral mucosa moist. Neck: 
Supple, no lymphadenopathy appreciated. Lungs: clear to auscultation. Cardiac: 
Regular rate and rhythm, Normal S1, S2, no murmurs appreciated. Abdomen: soft, 
nontender, nondistended, normoactive bowel sounds. Skin: Grossly intact. 
Extremities: Purposeful movement through all four extremities with full ROM. 
Neuro: Alert and oriented x 4, no focal deficits. 

 

OUTPATIENT FOLLOWUP NEEDS:  He should follow up with Dr. Kirk as 
directed for his concerns for GERD and continue on Protonix.  He should follow 
up with [facility] next week for further monitoring.

 

DIET:  As tolerated.

 

ACTIVITY:  As tolerated.

 

CONDITION:  Improved, stable.

 

DISPOSITION:  To home.

 

TIME SPENT:  Approximately 40 minutes on this discharge.

 

Again, this is only a brief summary of the patient's hospital course of stay.  
For full details, please refer to the full medical record.  If you have any 
further questions, please feel free to call me at [phone].

 

____________________________________ 

JANINE MCKEON NP

 

[id number]/CPS #: [id number]

MTDD

## 2023-04-11 ENCOUNTER — OFFICE VISIT (OUTPATIENT)
Dept: URGENT CARE | Facility: PHYSICIAN GROUP | Age: 23
End: 2023-04-11
Payer: COMMERCIAL

## 2023-04-11 VITALS
RESPIRATION RATE: 18 BRPM | BODY MASS INDEX: 20.02 KG/M2 | HEIGHT: 75 IN | WEIGHT: 161 LBS | TEMPERATURE: 98.5 F | SYSTOLIC BLOOD PRESSURE: 120 MMHG | OXYGEN SATURATION: 96 % | DIASTOLIC BLOOD PRESSURE: 60 MMHG | HEART RATE: 83 BPM

## 2023-04-11 DIAGNOSIS — K11.20 PAROTITIS: ICD-10-CM

## 2023-04-11 PROCEDURE — 99203 OFFICE O/P NEW LOW 30 MIN: CPT | Performed by: PHYSICIAN ASSISTANT

## 2023-04-11 RX ORDER — PREDNISONE 20 MG/1
TABLET ORAL
Qty: 10 TABLET | Refills: 0 | Status: SHIPPED | OUTPATIENT
Start: 2023-04-11

## 2023-04-11 RX ORDER — AMOXICILLIN 875 MG/1
875 TABLET, COATED ORAL 2 TIMES DAILY
Qty: 14 TABLET | Refills: 0 | Status: SHIPPED | OUTPATIENT
Start: 2023-04-11 | End: 2023-04-18

## 2023-04-11 ASSESSMENT — ENCOUNTER SYMPTOMS
HEADACHES: 0
CHILLS: 0
FEVER: 0
FACIAL SWELLING: 1

## 2023-04-11 NOTE — PROGRESS NOTES
"  Subjective:   Demarcus Saldana is a 22 y.o. male who presents today with   Chief Complaint   Patient presents with    Facial Swelling     This morning woke up with Facial swelling RT side. Pt. States he has a canker sore in RT side in mouth.     Facial Swelling  This is a new problem. The current episode started today. The problem occurs constantly. Pertinent negatives include no chills, fever or headaches. He has tried NSAIDs and ice for the symptoms. The treatment provided mild relief.   Patient states he has history of canker sores in the past and felt as if he had 1 for a couple of days but this morning woke up with swelling to the right side of his face with some mild discomfort to the area as well.  He states that he did not notice any dental pain but did have some pain when opening his mouth.  Patient states he is not having any other associated symptoms such as fever, body aches, headaches, fatigue or chills.    PMH:  has no past medical history on file.  MEDS:   Current Outpatient Medications:     amoxicillin (AMOXIL) 875 MG tablet, Take 1 Tablet by mouth 2 times a day for 7 days., Disp: 14 Tablet, Rfl: 0    predniSONE (DELTASONE) 20 MG Tab, Take 1 tab twice daily for 5 days, Disp: 10 Tablet, Rfl: 0  ALLERGIES: No Known Allergies  SURGHX: History reviewed. No pertinent surgical history.  SOCHX:  reports that he has never smoked. He has never used smokeless tobacco. He reports that he does not drink alcohol and does not use drugs.  FH: Reviewed with patient, not pertinent to this visit.     Review of Systems   Constitutional:  Negative for chills and fever.   HENT:  Positive for facial swelling.    Neurological:  Negative for headaches.      Objective:   /60 (BP Location: Left arm, Patient Position: Sitting, BP Cuff Size: Adult)   Pulse 83   Temp 36.9 °C (98.5 °F) (Temporal)   Resp 18   Ht 1.905 m (6' 3\")   Wt 73 kg (161 lb)   SpO2 96%   BMI 20.12 kg/m²   Physical Exam  Vitals and nursing note " reviewed.   Constitutional:       General: He is not in acute distress.     Appearance: Normal appearance. He is well-developed. He is not ill-appearing or toxic-appearing.   HENT:      Head: Normocephalic and atraumatic.        Comments: Swelling to the parotid gland as above.  No submandibular swelling and no swelling across the midline.  No tongue, lip, or throat swelling.  Patent airway.     Right Ear: Hearing, tympanic membrane and ear canal normal.      Left Ear: Hearing, tympanic membrane and ear canal normal.   Cardiovascular:      Rate and Rhythm: Normal rate and regular rhythm.      Heart sounds: Normal heart sounds.   Pulmonary:      Effort: Pulmonary effort is normal.      Breath sounds: Normal breath sounds. No stridor. No wheezing, rhonchi or rales.   Musculoskeletal:      Comments: Normal movement in all 4 extremities   Skin:     General: Skin is warm and dry.   Neurological:      Mental Status: He is alert.      Coordination: Coordination normal.   Psychiatric:         Mood and Affect: Mood normal.       Assessment/Plan:   Assessment    1. Parotitis  - amoxicillin (AMOXIL) 875 MG tablet; Take 1 Tablet by mouth 2 times a day for 7 days.  Dispense: 14 Tablet; Refill: 0  - predniSONE (DELTASONE) 20 MG Tab; Take 1 tab twice daily for 5 days  Dispense: 10 Tablet; Refill: 0  Symptoms and presentation are consistent with parotid gland swelling and we will treat with antibiotics to cover for any bacterial infection as well as steroids to help reduce swelling and inflammation.  Also recommend he use sour candy to help with any potential of salivary gland being blocked and he is understanding of low threshold to have for close monitoring of any worsening that would warrant him going to the ER for further evaluation.  Would recommend continue with cold compress to the area as well.    Differential diagnosis, natural history, supportive care, and indications for immediate follow-up discussed.   Patient given  instructions and understanding of medications and treatment.    If not improving in 3-5 days, F/U with PCP or return to UC if symptoms worsen.    Patient agreeable to plan.      Please note that this dictation was created using voice recognition software. I have made every reasonable attempt to correct obvious errors, but I expect that there are errors of grammar and possibly content that I did not discover before finalizing the note.    Bert Moffett PA-C